# Patient Record
Sex: FEMALE | Race: WHITE | NOT HISPANIC OR LATINO | Employment: OTHER | URBAN - NONMETROPOLITAN AREA
[De-identification: names, ages, dates, MRNs, and addresses within clinical notes are randomized per-mention and may not be internally consistent; named-entity substitution may affect disease eponyms.]

---

## 2022-06-14 ENCOUNTER — OFFICE VISIT (OUTPATIENT)
Dept: UROLOGY | Facility: CLINIC | Age: 87
End: 2022-06-14

## 2022-06-14 VITALS — HEIGHT: 65 IN | WEIGHT: 154 LBS | BODY MASS INDEX: 25.66 KG/M2

## 2022-06-14 DIAGNOSIS — N26.1 ATROPHIC KIDNEY, ACQUIRED: ICD-10-CM

## 2022-06-14 DIAGNOSIS — R33.9 INCOMPLETE EMPTYING OF BLADDER: ICD-10-CM

## 2022-06-14 DIAGNOSIS — N28.1 ACQUIRED RENAL CYST OF RIGHT KIDNEY: ICD-10-CM

## 2022-06-14 DIAGNOSIS — N13.30 HYDRONEPHROSIS OF LEFT KIDNEY: Primary | ICD-10-CM

## 2022-06-14 DIAGNOSIS — R35.0 FREQUENCY OF MICTURITION: ICD-10-CM

## 2022-06-14 PROCEDURE — 99204 OFFICE O/P NEW MOD 45 MIN: CPT | Performed by: NURSE PRACTITIONER

## 2022-06-14 RX ORDER — NEOMYCIN/POLYMYXIN B/HYDROCORT 3.5-10K-1
SUSPENSION, DROPS(FINAL DOSAGE FORM)(ML) OPHTHALMIC (EYE)
COMMUNITY
Start: 2022-04-18

## 2022-06-14 RX ORDER — SERTRALINE HYDROCHLORIDE 100 MG/1
TABLET, FILM COATED ORAL
COMMUNITY
Start: 2022-06-13

## 2022-06-14 RX ORDER — ALPRAZOLAM 0.5 MG/1
TABLET ORAL
COMMUNITY
Start: 2022-05-19

## 2022-06-14 RX ORDER — ONDANSETRON 4 MG/1
TABLET, FILM COATED ORAL
COMMUNITY
Start: 2022-05-20

## 2022-06-14 RX ORDER — DONEPEZIL HYDROCHLORIDE 10 MG/1
TABLET, FILM COATED ORAL
COMMUNITY
Start: 2022-06-05

## 2022-06-14 RX ORDER — SODIUM PHOSPHATE, DIBASIC AND SODIUM PHOSPHATE, MONOBASIC 7; 19 G/133ML; G/133ML
ENEMA RECTAL ONCE
COMMUNITY

## 2022-06-14 RX ORDER — OXYBUTYNIN CHLORIDE 5 MG/1
TABLET ORAL
COMMUNITY
Start: 2022-06-01

## 2022-06-14 RX ORDER — CYANOCOBALAMIN 1000 UG/ML
INJECTION, SOLUTION INTRAMUSCULAR; SUBCUTANEOUS
COMMUNITY
Start: 2022-06-05

## 2022-06-14 RX ORDER — TAMSULOSIN HYDROCHLORIDE 0.4 MG/1
1 CAPSULE ORAL DAILY
Qty: 30 CAPSULE | Refills: 1 | Status: SHIPPED | OUTPATIENT
Start: 2022-06-14

## 2022-06-14 RX ORDER — BISACODYL 5 MG/1
5 TABLET, DELAYED RELEASE ORAL DAILY PRN
COMMUNITY

## 2022-06-14 RX ORDER — SIMVASTATIN 20 MG
TABLET ORAL
COMMUNITY
Start: 2022-05-16

## 2022-06-14 NOTE — PROGRESS NOTES
"Chief Complaint  HYDRONEPHROSIS  and RENAL CYST  (NEW PT WITH RIGHT RENAL CYST/HYDRONEPHROSIS/FLANK PAIN)    Rafael Cruz presents to [facility]  History of Present Illness     Ms. Katie Cruz\"JUAN\" is a pleasant 89-year-old female patient who presents to clinic today for evaluation accompanied by her niece and staff.  The patient is wheelchair dependent, very hard of hearing, she is a resident at [facility].  She has been referred to us by her PCP with concerns of left hydronephrosis, right renal cyst seen on her most recent renal ultrasound done on 05/25/2022 secondary to abdominal pain, and flank pain.    On evaluation in clinic today, the patient is in no apparent discomfort.  She is alert and oriented, and able to verbalize needs.  She however reports lower quadrant abdominal pain/flank pain that has been intermittent, but she attributes it to her severe constipation also noted on her KUB.  Was lower back pain which is chronic in origin, denies pelvic pressure or suprapubic discomfort.  She does not have any CVA tenderness.  She denies having any urinary symptoms of frequency, urgency, but she is unable to urinate at this time.  Reports she voided prior to appointment time, her PVR is 90 mL.  Creatinine is 18.5, BUN is 25, with an EGFR of 25%.    We both reviewed/discussed her renal ultrasound results which showed that patient has a small right kidney measuring 7.5 cm x 4.4 cm x 4.0 cm.  Kidney measures 10.7 cm x 4.6 cm x 4.5.  There is a 5.7 cm cystic structure at the right kidney.  There is mild to moderate left hydronephrosis.  There is no evidence of a calculus, there is no gross evidence of renal mass.      The rest of her PMHx as listed below.     Objective   Vital Signs:  Ht 163.8 cm (64.5\")   Wt 69.9 kg (154 lb)   BMI 26.03 kg/m²   Estimated body mass index is 26.03 kg/m² as calculated from the following:    Height " "as of this encounter: 163.8 cm (64.5\").    Weight as of this encounter: 69.9 kg (154 lb).    BMI is >= 25 and <30. (Overweight) The following options were offered after discussion;: weight loss educational material (shared in after visit summary), exercise counseling/recommendations and nutrition counseling/recommendations    Physical Exam  Constitutional:       General: She is in acute distress.      Appearance: She is well-developed.   HENT:      Head: Normocephalic and atraumatic.   Eyes:      Pupils: Pupils are equal, round, and reactive to light.   Neck:      Thyroid: No thyromegaly.      Trachea: No tracheal deviation.   Cardiovascular:      Rate and Rhythm: Normal rate and regular rhythm.      Heart sounds: No murmur heard.  Pulmonary:      Effort: Pulmonary effort is normal. No respiratory distress.      Breath sounds: Normal breath sounds. No stridor. No wheezing.   Abdominal:      General: Bowel sounds are normal. There is distension.      Palpations: Abdomen is soft.      Tenderness: There is abdominal tenderness. There is rebound.   Genitourinary:     Labia:         Right: No tenderness.         Left: No tenderness.       Vagina: Normal. No vaginal discharge.   Musculoskeletal:         General: Tenderness (  Bilateral flank pain, lower back pain) present. No deformity. Normal range of motion.      Cervical back: Normal range of motion.   Skin:     General: Skin is warm and dry.      Capillary Refill: Capillary refill takes less than 2 seconds.      Coloration: Skin is pale.      Findings: No erythema or rash.   Neurological:      Mental Status: She is alert and oriented to person, place, and time.      Cranial Nerves: No cranial nerve deficit.      Sensory: No sensory deficit.      Motor: Weakness present.      Coordination: Coordination normal.   Psychiatric:         Behavior: Behavior normal.         Thought Content: Thought content normal.         Judgment: Judgment normal.       Result Review :  The " "following data was reviewed by: Griselda Cheng-Akwa, APRN on 06/14/2022:    Data reviewed: Radiologic studies Reviewed KUB plus renal ultrasound done at [place] on 05/22 showing a small right kidney, right renal cyst take structure and left hydronephrosis          Assessment and Plan   Diagnoses and all orders for this visit:    1. Hydronephrosis of left kidney (Primary)  -     CT Abdomen Pelvis Without Contrast; Future  -     tamsulosin (FLOMAX) 0.4 MG capsule 24 hr capsule; Take 1 capsule by mouth Daily.  Dispense: 30 capsule; Refill: 1    2. Acquired renal cyst of right kidney  -     CT Abdomen Pelvis Without Contrast; Future    3. Atrophic kidney, acquired  -     CT Abdomen Pelvis Without Contrast; Future    4. Incomplete emptying of bladder  -     Bladder Scan  -     CT Abdomen Pelvis Without Contrast; Future  -     tamsulosin (FLOMAX) 0.4 MG capsule 24 hr capsule; Take 1 capsule by mouth Daily.  Dispense: 30 capsule; Refill: 1    5. Frequency of micturition  -     Cancel: POC Urinalysis Dipstick, Automated  -     Cancel: Urine Culture - Urine, Urine, Clean Catch  -     CT Abdomen Pelvis Without Contrast; Future  -     tamsulosin (FLOMAX) 0.4 MG capsule 24 hr capsule; Take 1 capsule by mouth Daily.  Dispense: 30 capsule; Refill: 1         ASSESSMENT  RIGHT RENAL CYST / LEFT HYDRONEPHROSIS/atrophic right kidney  Ms. Katie Cruz\"JUAN\" is a pleasant 89-year-old female patient who presents to clinic today for evaluation accompanied by her niece and staff.  The patient is wheelchair dependent, very hard of hearing, she is a resident at [facility].  She has been referred to us by her PCP with concerns of left hydronephrosis, right renal cyst seen on her most recent renal ultrasound done on 05/25/2022 secondary to abdominal pain, and flank pain.  She is in no apparent discomfort today and reports doing relatively well.  She denies pelvic pressure or suprapubic discomfort, she denies having " any CVA tenderness.    RIGHT Renal cyst-I discussed the Bosniak classification of renal cysts.  I discussed the strict criteria involved with making a decision regarding intervention.  We discussed the fact that this is likely a Bosniak type I cyst which has sharp distinct borders and a thin wall and no internal echoes versus a Bosniak 2 with a thicker wall and faint striations. We discussed the risks of malignancy in these situations is essentially 0 and requires no further intervention however we discussed the fact that a Bosniak 3 has about a 28% chance of malignancy and finally a Bosniak for probably is representative of a renal cell carcinoma variant.  We discussed the fact that these are generally asymptomatic and do not require intervention and that the appropriate surgical management is a radiographic cyst puncture when causing pain or problems particularly on the left with an early satiety I am to get a CT renal mass protocol to define the type of cyst this is.    LEFT Flank Pain/LEFT HYdronephrosis;We both reviewed and discussed her renal ultrasound results which showed Right-sided hydronephrosis. No distal ureteral stones  renal stones evident as seen in the study. She has back pain,  bilateral flank pain that is intermittent, she denies abdominal pain, pelvic pain or pressure.  She denies having any episodes of nausea, vomiting, or diarrhea.  She denies having any urinary symptoms either.  We discussed Hydronephrosis and hydroureter. I explained  Hydronephrosis as the swelling of a kidney due to a build-up of urine. It happens when urine cannot drain out from the kidney to the bladder from a blockage or obstruction. Hydronephrosis can occur in one or both kidneys.      This can also be caused by an anatomical defect that doesn't allow urine to drain properly, it could be a congenital blockage, a kidney stone, ETC. I explained hydroureter as a dilation of the ureter, and that both the presence of  hydronephrosis or hydroureter can be physiologic or pathologic. It may be acute or chronic, unilateral or bilateral.       PLAN  I discussed her plan of care with Dr. Landry and he is agreeable.     The patient is unable to give us any urine sample today for evaluation, advised to drop urine off.    The patient will be scheduled for tract investigation via CT renal mass protocol.  Consult imaging done at [place].  Bring the disc and report at her next appointment.    Explained the need and importance of exploring the cause of hydronephrosis   Patient is agreeable with plan of care.     We discussed treatment options for her flank pain with patient encouraged to continue conservative therapy alternating NSAID/Tylenol as tolerated, Also including hot baths, showers, warm compresses to lower back as tolerated. Also encouraged walking, physical therapy, light exercises as tolerated    Rest is the most important treatment in the case of flank pain. Rest and physical therapy are enough to improve minor pain. Discussed to monitor her daily routine with prevention of flank pain by: At least Drinking 8 glasses of water per day, Limiting your alcohol consumption.  Having a healthy diet containing fruits, vegetables, and a lot of fluids, Practicing safe sex.  Also maintaining proper hygiene of your body as well as the environment.    We will follow-up in clinic in 1 month/review of CT imaging    Patient/caregivers agreeable plan of care.    Follow Up   Return in about 1 month (around 7/14/2022) for Next scheduled follow up, for hydronephrosis/renal cysts-Review CT Scan/BLADER SCAN/EVAL FLOMAX.  Patient was given instructions and counseling regarding her condition or for health maintenance advice. Please see specific information pulled into the AVS if appropriate.

## 2022-07-20 ENCOUNTER — OFFICE VISIT (OUTPATIENT)
Dept: UROLOGY | Facility: CLINIC | Age: 87
End: 2022-07-20

## 2022-07-20 VITALS — BODY MASS INDEX: 26.29 KG/M2 | WEIGHT: 154 LBS | HEIGHT: 64 IN

## 2022-07-20 DIAGNOSIS — N95.2 ATROPHIC VAGINITIS: ICD-10-CM

## 2022-07-20 DIAGNOSIS — N39.46 MIXED STRESS AND URGE URINARY INCONTINENCE: ICD-10-CM

## 2022-07-20 DIAGNOSIS — N32.81 OAB (OVERACTIVE BLADDER): ICD-10-CM

## 2022-07-20 DIAGNOSIS — N13.30 HYDRONEPHROSIS OF LEFT KIDNEY: Primary | ICD-10-CM

## 2022-07-20 DIAGNOSIS — N28.1 ACQUIRED RENAL CYST OF RIGHT KIDNEY: ICD-10-CM

## 2022-07-20 PROCEDURE — 99214 OFFICE O/P EST MOD 30 MIN: CPT | Performed by: NURSE PRACTITIONER

## 2022-07-20 NOTE — PROGRESS NOTES
"Chief Complaint   Patient presents with   • HYDRONEPHROSIS OF THE LEFT KIDNEY/FLANK PAIN/ACUTE CYSTITIS     ONE MONTH FOLLOW UP FOR HYDRONEPHROSIS/REVIEW CT SCAN     Katie Cruz is a 89 y.o. female who RETURNS to the office today for follow up appointment for HYDRONEPHROSIS OF THE LEFT KIDNEY/FLANK PAIN/ACUTE CYSTITIS (ONE MONTH FOLLOW UP FOR HYDRONEPHROSIS/REVIEW CT SCAN)  .  HPI  Ms. Katie Cruz\"JUAN\" is a pleasant 89-year-old female patient who RETURNS  to clinic today for evaluation accompanied by her niece and staff.   She is here today to review her CT scan results which are unremarkable.  There is complete resolution of hydronephrosis of her left kidney which is not visible on the CT scan results.  However has a left renal simple cyst.     The patient is wheelchair dependent, very hard of hearing, she is a resident at [facility].  She has been referred to us by her PCP with concerns of left hydronephrosis, right renal cyst seen on her most recent renal ultrasound done on 05/25/2022 secondary to abdominal pain, and flank pain.     On evaluation in clinic today, the patient is in no apparent discomfort.  She is alert and oriented, and able to verbalize needs.  She however reports lower quadrant abdominal pain/flank pain that has been intermittent, but she attributes it to her severe constipation also noted on her KUB.  Was lower back pain which is chronic in origin, denies pelvic pressure or suprapubic discomfort.  She does not have any CVA tenderness.  She denies having any urinary symptoms of frequency, urgency, but she is unable to urinate at this time.  Reports she voided prior to appointment time, her PVR is 20 mL.  Creatinine is 18.5, BUN is 25, with an EGFR of 25%.     Again, we both reviewed/discussed her renal ultrasound results which showed that patient has a small right kidney measuring 7.5 cm x 4.4 cm x 4.0 cm.  Kidney measures 10.7 cm x 4.6 cm x 4.5.  There is a 5.7 cm " cystic structure at the right kidney.  There is mild to moderate left hydronephrosis.  There is no evidence of a calculus, there is no gross evidence of renal mass.       The rest of her PMHx as listed below.    Review of Systems   Constitutional: Positive for activity change and fatigue. Negative for chills and fever.   HENT: Negative for congestion, sinus pressure and sinus pain.    Eyes: Negative for discharge and itching.   Respiratory: Negative for apnea, cough, chest tightness, shortness of breath and wheezing.    Cardiovascular: Negative for chest pain and leg swelling.   Gastrointestinal: Negative for abdominal distention, abdominal pain, nausea and vomiting.   Endocrine: Negative for cold intolerance and heat intolerance.   Genitourinary: Positive for flank pain and frequency. Negative for difficulty urinating, dysuria, hematuria and urgency.   Musculoskeletal: Positive for back pain and gait problem (  WHEEL CHAIR DEPENDENT). Negative for joint swelling.   Skin: Negative for color change.   Neurological: Positive for weakness. Negative for dizziness and headaches.   Psychiatric/Behavioral: Positive for sleep disturbance. Negative for behavioral problems and confusion. The patient is nervous/anxious and is hyperactive.    All other systems reviewed and are negative.      ACTIVE PROBLEMS:   Specialty Problems    None         PAST MEDICAL HISTORY:  Past Medical History:   Diagnosis Date   • Age related osteoporosis    • Allergic rhinitis    • Alzheimer disease (HCC)    • Gastroesophageal reflux disease with esophagitis without hemorrhage    • Generalized anxiety disorder    • Hyperlipidemia    • Hypertension    • Osteoarthritis    • Other recurrent depressive disorders (HCC)        SURGICAL HISTORY:  Past Surgical History:   Procedure Laterality Date   • APPENDECTOMY         FAMILY HISTORY:  Family History   Problem Relation Age of Onset   • No Known Problems Father    • No Known Problems Mother        SOCIAL  "HISTORY:  Social History     Tobacco Use   • Smoking status: Never Smoker   • Smokeless tobacco: Never Used   Substance Use Topics   • Alcohol use: Never       CURRENT MEDICATION:    Current Outpatient Medications:   •  ALPRAZolam (XANAX) 0.5 MG tablet, , Disp: , Rfl:   •  bisacodyl (DULCOLAX) 5 MG EC tablet, Take 5 mg by mouth Daily As Needed for Constipation., Disp: , Rfl:   •  cyanocobalamin 1000 MCG/ML injection, , Disp: , Rfl:   •  donepezil (ARICEPT) 10 MG tablet, , Disp: , Rfl:   •  magnesium hydroxide (MILK OF MAGNESIA) 2400 MG/10ML suspension suspension, Take 10 mL by mouth Daily As Needed., Disp: , Rfl:   •  neomycin-polymyxin-hydrocortisone (CORTISPORIN) 3.5-60338-0 ophthalmic suspension, , Disp: , Rfl:   •  ondansetron (ZOFRAN) 4 MG tablet, , Disp: , Rfl:   •  oxybutynin (DITROPAN) 5 MG tablet, , Disp: , Rfl:   •  sertraline (ZOLOFT) 100 MG tablet, , Disp: , Rfl:   •  simvastatin (ZOCOR) 20 MG tablet, , Disp: , Rfl:   •  Sodium Phosphates (fleet enema) 7-19 GM/118ML enema, Insert  into the rectum 1 (One) Time., Disp: , Rfl:   •  tamsulosin (FLOMAX) 0.4 MG capsule 24 hr capsule, Take 1 capsule by mouth Daily., Disp: 30 capsule, Rfl: 1    ALLERGIES:  Cephalexin and Penicillins    VISIT VITALS:  Ht 163.8 cm (64.49\")   Wt 69.9 kg (154 lb)   BMI 26.04 kg/m²     Physical Exam  Constitutional:       General: She is in acute distress.      Appearance: She is well-developed.   HENT:      Head: Normocephalic and atraumatic.   Eyes:      Pupils: Pupils are equal, round, and reactive to light.   Neck:      Thyroid: No thyromegaly.      Trachea: No tracheal deviation.   Cardiovascular:      Rate and Rhythm: Normal rate and regular rhythm.      Heart sounds: No murmur heard.  Pulmonary:      Effort: Pulmonary effort is normal. No respiratory distress.      Breath sounds: Normal breath sounds. No stridor. No wheezing.   Abdominal:      General: Bowel sounds are normal.      Palpations: Abdomen is soft.      " "Tenderness: There is abdominal tenderness.   Genitourinary:     Labia:         Right: No tenderness.         Left: No tenderness.       Vagina: Normal. No vaginal discharge.   Musculoskeletal:         General: Tenderness present. No deformity. Normal range of motion.      Cervical back: Normal range of motion.   Skin:     General: Skin is warm and dry.      Capillary Refill: Capillary refill takes less than 2 seconds.      Coloration: Skin is pale.      Findings: Bruising present. No erythema or rash.   Neurological:      Mental Status: She is alert and oriented to person, place, and time.      Cranial Nerves: No cranial nerve deficit.      Sensory: No sensory deficit.      Motor: Weakness present.      Coordination: Coordination abnormal.      Gait: Gait abnormal.   Psychiatric:         Behavior: Behavior normal.         Thought Content: Thought content normal.         Judgment: Judgment normal.         Assessment & Plan      Diagnosis Plan   1. Hydronephrosis of left kidney     2. Acquired renal cyst of right kidney     3. OAB (overactive bladder)     4. Mixed stress and urge urinary incontinence     5. Atrophic vaginitis                                              ASSESSMENT  RIGHT RENAL CYST / LEFT HYDRONEPHROSIS/atrophic right kidney  Ms. Katie Cruz\"JUAN\" is a pleasant 89-year-old female patient who presents to clinic today for evaluation accompanied by her niece and staff.  This is a 1 month follow-up, she is here to review her CT scan results which are unremarkable, with her prior left hydronephrosis seen on her prior renal ultrasound completely resolved.  Continues to have a right renal cyst which is unremarkable.     The patient is wheelchair dependent, very hard of hearing, she is a resident at [facility].  She has been referred to us by her PCP with concerns of left hydronephrosis, right renal cyst seen on her most recent renal ultrasound done on 05/25/2022 secondary to abdominal " pain, and flank pain.  She is in no apparent discomfort today and reports doing relatively well.  She denies pelvic pressure or suprapubic discomfort, she denies having any CVA tenderness.     RIGHT Renal cyst-I discussed the Bosniak classification of renal cysts.  I discussed the strict criteria involved with making a decision regarding intervention.  We discussed the fact that this is likely a Bosniak type I cyst which has sharp distinct borders and a thin wall and no internal echoes versus a Bosniak 2 with a thicker wall and faint striations. We discussed the risks of malignancy in these situations is essentially 0 and requires no further intervention however we discussed the fact that a Bosniak 3 has about a 28% chance of malignancy and finally a Bosniak for probably is representative of a renal cell carcinoma variant.  We discussed the fact that these are generally asymptomatic and do not require intervention and that the appropriate surgical management is a radiographic cyst puncture when causing pain or problems particularly on the left with an early satiety I am to get a CT renal mass protocol to define the type of cyst this is.     LEFT Flank Pain/LEFT Hydronephrosis- resolved;We both reviewed and discussed her renal ultrasound results which showed Right-sided hydronephrosis. No distal ureteral stones  renal stones evident as seen in the study. She has back pain,  bilateral flank pain that is intermittent, she denies abdominal pain, pelvic pain or pressure.  She denies having any episodes of nausea, vomiting, or diarrhea.  She denies having any urinary symptoms either.  We discussed Hydronephrosis and hydroureter. I explained  Hydronephrosis as the swelling of a kidney due to a build-up of urine. It happens when urine cannot drain out from the kidney to the bladder from a blockage or obstruction. Hydronephrosis can occur in one or both kidneys.      This can also be caused by an anatomical defect that  doesn't allow urine to drain properly, it could be a congenital blockage, a kidney stone, ETC. I explained hydroureter as a dilation of the ureter, and that both the presence of hydronephrosis or hydroureter can be physiologic or pathologic. It may be acute or chronic, unilateral or bilateral.                                                   PLAN  I discussed her plan of care with Dr. Landry and he is agreeable.      The patient is unable to give us any urine sample today for evaluation, advised to drop urine off.      We discussed treatment options for her flank pain with patient encouraged to continue conservative therapy alternating NSAID/Tylenol as tolerated, Also including hot baths, showers, warm compresses to lower back as tolerated. Also encouraged walking, physical therapy, light exercises as tolerated     Rest is the most important treatment in the case of flank pain. Rest and physical therapy are enough to improve minor pain. Discussed to monitor her daily routine with prevention of flank pain by: At least Drinking 8 glasses of water per day, Limiting your alcohol consumption.  Having a healthy diet containing fruits, vegetables, and a lot of fluids, Practicing safe sex.  Also maintaining proper hygiene of your body as well as the environment.     We will follow-up in clinic in clinic in 1 year, or on as-needed basis review of CT imaging/repeat renal ultrasound     Patient/caregivers agreeable plan of care.     Follow Up   Return in about 1 month (around 7/14/2022) for Next scheduled follow up, for hydronephrosis/renal cysts-Review CT Scan/BLADER SCAN/EVAL FLOMAX.  Patient was given instructions and counseling regarding her condition or for health maintenance advice. Please see specific information pulled into the AVS if appropriate.      Return in about 1 year (around 7/20/2023) for Next scheduled follow up, Review renal ultrasound.    BMI is >= 25 and <30. (Overweight) The following options were  offered after discussion;: weight loss educational material (shared in after visit summary), exercise counseling/recommendations and nutrition counseling/recommendations          Griselda Cheng-Akwa, APRN